# Patient Record
Sex: FEMALE | Race: WHITE | NOT HISPANIC OR LATINO
[De-identification: names, ages, dates, MRNs, and addresses within clinical notes are randomized per-mention and may not be internally consistent; named-entity substitution may affect disease eponyms.]

---

## 2023-02-21 ENCOUNTER — APPOINTMENT (OUTPATIENT)
Dept: PAIN MANAGEMENT | Facility: CLINIC | Age: 48
End: 2023-02-21
Payer: COMMERCIAL

## 2023-02-21 DIAGNOSIS — Z86.79 PERSONAL HISTORY OF OTHER DISEASES OF THE CIRCULATORY SYSTEM: ICD-10-CM

## 2023-02-21 PROBLEM — Z00.00 ENCOUNTER FOR PREVENTIVE HEALTH EXAMINATION: Status: ACTIVE | Noted: 2023-02-21

## 2023-02-21 PROCEDURE — 99204 OFFICE O/P NEW MOD 45 MIN: CPT

## 2023-02-21 RX ORDER — METHOCARBAMOL 500 MG/1
500 TABLET, FILM COATED ORAL
Qty: 90 | Refills: 0 | Status: ACTIVE | COMMUNITY
Start: 2023-02-21 | End: 1900-01-01

## 2023-02-21 RX ORDER — TRAMADOL HYDROCHLORIDE 50 MG/1
50 TABLET, COATED ORAL EVERY 6 HOURS
Qty: 28 | Refills: 3 | Status: ACTIVE | COMMUNITY
Start: 2023-02-21 | End: 1900-01-01

## 2023-02-23 PROBLEM — Z86.79 HISTORY OF HYPERTENSION: Status: RESOLVED | Noted: 2023-02-23 | Resolved: 2023-02-23

## 2023-02-23 NOTE — PHYSICAL EXAM
[Normal] : Well developed, in no acute distress, alert and oriented to person, place and time [Wheelchair] : uses a wheelchair [UE/LE] : Motor: 5/5 motor strength in bilateral upper & lower extremities [Facet Tenderness] : no facet tenderness [Sacroiliac tenderness] : no sacroiliac tenderness [Spurling] : negative Spurling's Test [SLR] : negative straight leg raise [de-identified] : TTP right thoracic region around T9/10 rib, + swelling, + bruising

## 2023-02-24 ENCOUNTER — APPOINTMENT (OUTPATIENT)
Dept: PAIN MANAGEMENT | Facility: CLINIC | Age: 48
End: 2023-02-24
Payer: COMMERCIAL

## 2023-02-24 VITALS — DIASTOLIC BLOOD PRESSURE: 85 MMHG | SYSTOLIC BLOOD PRESSURE: 140 MMHG | OXYGEN SATURATION: 98 % | HEART RATE: 68 BPM

## 2023-02-24 PROCEDURE — 99214 OFFICE O/P EST MOD 30 MIN: CPT | Mod: 25

## 2023-02-24 PROCEDURE — 64420 NJX AA&/STRD NTRCOST NRV 1: CPT

## 2023-02-24 RX ORDER — ONDANSETRON 4 MG/1
4 TABLET, ORALLY DISINTEGRATING ORAL
Qty: 21 | Refills: 1 | Status: ACTIVE | COMMUNITY
Start: 2023-02-24 | End: 1900-01-01

## 2023-02-24 NOTE — HISTORY OF PRESENT ILLNESS
[Persistent pain despite utilization of non-opioid alternative(s)] : Persistent pain despite utilization of non-opioid alternative(s) [Opioids for pain that has lasted more than 3 months, or past time of normal tissue healing (> 3 months)] : Opioids for pain that has lasted more than 3 months, or past time of normal tissue healing (> 3 months)

## 2023-02-27 NOTE — PHYSICAL EXAM
[Normal] : Well developed, in no acute distress, alert and oriented to person, place and time [Wheelchair] : uses a wheelchair [UE/LE] : Motor: 5/5 motor strength in bilateral upper & lower extremities [Facet Tenderness] : no facet tenderness [Sacroiliac tenderness] : no sacroiliac tenderness [Spurling] : negative Spurling's Test [SLR] : negative straight leg raise [de-identified] : TTP right thoracic region around T9/10 rib, + swelling, + bruising

## 2023-03-20 ENCOUNTER — APPOINTMENT (OUTPATIENT)
Dept: PAIN MANAGEMENT | Facility: CLINIC | Age: 48
End: 2023-03-20
Payer: COMMERCIAL

## 2023-03-20 DIAGNOSIS — S22.39XA FRACTURE OF ONE RIB, UNSPECIFIED SIDE, INITIAL ENCOUNTER FOR CLOSED FRACTURE: ICD-10-CM

## 2023-03-20 DIAGNOSIS — M79.18 MYALGIA, OTHER SITE: ICD-10-CM

## 2023-03-20 DIAGNOSIS — M62.838 OTHER MUSCLE SPASM: ICD-10-CM

## 2023-03-20 PROCEDURE — 99213 OFFICE O/P EST LOW 20 MIN: CPT

## 2023-03-20 NOTE — PHYSICAL EXAM
[Normal] : Well developed, in no acute distress, alert and oriented to person, place and time [Wheelchair] : uses a wheelchair [UE/LE] : Motor: 5/5 motor strength in bilateral upper & lower extremities [Facet Tenderness] : no facet tenderness [Sacroiliac tenderness] : no sacroiliac tenderness [Spurling] : negative Spurling's Test [SLR] : negative straight leg raise [de-identified] : TTP right thoracic region around T9/10 rib, + swelling, + bruising